# Patient Record
Sex: FEMALE
[De-identification: names, ages, dates, MRNs, and addresses within clinical notes are randomized per-mention and may not be internally consistent; named-entity substitution may affect disease eponyms.]

---

## 2018-05-08 PROBLEM — Z00.00 ENCOUNTER FOR PREVENTIVE HEALTH EXAMINATION: Status: ACTIVE | Noted: 2018-05-08

## 2018-05-09 ENCOUNTER — APPOINTMENT (OUTPATIENT)
Dept: ENDOCRINOLOGY | Facility: CLINIC | Age: 58
End: 2018-05-09

## 2018-08-18 ENCOUNTER — APPOINTMENT (EMERGENCY)
Dept: RADIOLOGY | Facility: HOSPITAL | Age: 58
End: 2018-08-18

## 2018-08-18 ENCOUNTER — HOSPITAL ENCOUNTER (EMERGENCY)
Facility: HOSPITAL | Age: 58
Discharge: HOME | End: 2018-08-18
Attending: EMERGENCY MEDICINE | Admitting: EMERGENCY MEDICINE

## 2018-08-18 VITALS
BODY MASS INDEX: 30.78 KG/M2 | HEIGHT: 70 IN | DIASTOLIC BLOOD PRESSURE: 68 MMHG | RESPIRATION RATE: 17 BRPM | OXYGEN SATURATION: 99 % | WEIGHT: 215 LBS | SYSTOLIC BLOOD PRESSURE: 102 MMHG | HEART RATE: 82 BPM | TEMPERATURE: 98.3 F

## 2018-08-18 DIAGNOSIS — S50.311A ABRASION OF RIGHT ELBOW, INITIAL ENCOUNTER: ICD-10-CM

## 2018-08-18 DIAGNOSIS — S00.01XA ABRASION OF SCALP, INITIAL ENCOUNTER: ICD-10-CM

## 2018-08-18 DIAGNOSIS — S00.93XA HEAD CONTUSION: Primary | ICD-10-CM

## 2018-08-18 PROCEDURE — 70450 CT HEAD/BRAIN W/O DYE: CPT

## 2018-08-18 PROCEDURE — 99284 EMERGENCY DEPT VISIT MOD MDM: CPT

## 2018-08-18 PROCEDURE — 72125 CT NECK SPINE W/O DYE: CPT

## 2018-08-18 PROCEDURE — 72100 X-RAY EXAM L-S SPINE 2/3 VWS: CPT

## 2018-08-18 RX ORDER — LISINOPRIL 40 MG/1
40 TABLET ORAL DAILY
COMMUNITY

## 2018-08-18 RX ORDER — TRAMADOL HYDROCHLORIDE 50 MG/1
50 TABLET ORAL EVERY 6 HOURS PRN
COMMUNITY

## 2018-08-18 RX ORDER — ASPIRIN 81 MG/1
81 TABLET, CHEWABLE ORAL DAILY
COMMUNITY

## 2018-08-18 RX ORDER — IBUPROFEN 600 MG/1
600 TABLET ORAL ONCE
Status: COMPLETED | OUTPATIENT
Start: 2018-08-18 | End: 2018-08-18

## 2018-08-18 RX ADMIN — IBUPROFEN 600 MG: 600 TABLET ORAL at 12:56

## 2018-08-18 NOTE — DISCHARGE INSTRUCTIONS
You were evaluated in the emergency today after your fall  The CT scan of your head and neck and the X-ray of your spine did not show any fracture or intracranial bleed  Take ibuprofen or tylenol as needed for pain  Return to the emergency department for severe worsening headache, persistent vomiting, confusion, loss of consciousness, persistent dizziness, any other new or concerning symptoms  Concussion   WHAT YOU NEED TO KNOW:   A concussion is a mild brain injury  It is usually caused by a bump or blow to the head from a fall, a motor vehicle crash, or a sports injury  Sometimes being shaken forcefully may cause a concussion  DISCHARGE INSTRUCTIONS:   Have someone else call 911 for the following:   · Someone tries to wake you and cannot do so  · You have a seizure, increasing confusion, or a change in personality  · Your speech becomes slurred, or you have new vision problems  Return to the emergency department if:   · You have a severe headache that does not go away  · You have arm or leg weakness, numbness, or new problems with coordination  · You have blood or clear fluid coming out of the ears or nose  Contact your healthcare provider if:   · You have nausea or are vomiting  · You feel more sleepy than usual     · Your symptoms get worse  · Your symptoms last longer than 6 weeks after the injury  · You have questions or concerns about your condition or care  Medicines:   · Acetaminophen  helps to decrease pain  It is available without a doctor's order  Ask how much to take and how often to take it  Follow directions  Acetaminophen can cause liver damage if not taken correctly  · NSAIDs , such as ibuprofen, help decrease swelling and pain  NSAIDs can cause stomach bleeding or kidney problems in certain people  If you take blood thinner medicine, always ask your healthcare provider if NSAIDs are safe for you  Always read the medicine label and follow directions      · Take your medicine as directed  Contact your healthcare provider if you think your medicine is not helping or if you have side effects  Tell him or her if you are allergic to any medicine  Keep a list of the medicines, vitamins, and herbs you take  Include the amounts, and when and why you take them  Bring the list or the pill bottles to follow-up visits  Carry your medicine list with you in case of an emergency  Follow up with your healthcare provider as directed:  Write down your questions so you remember to ask them during your visits  Self-care:   · Rest  from physical and mental activities as directed  Mental activities are those that require thinking, concentration, and attention  You will need to rest until your symptoms are gone  Rest will allow you to recover from your concussion  Ask your healthcare provider when you can return to work and other daily activities  · Have someone stay with you for the first 24 hours after your injury  Your healthcare provider should be contacted if your symptoms get worse, or you develop new symptoms  · Do not participate in sports and physical activities until your healthcare provider says it is okay  They could make your symptoms worse or lead to another concussion  Your healthcare provider will tell you when it is okay for you to return to sports or physical activities  Prevent another concussion:   · Wear protective sports equipment that fit properly  Helmets help decrease your risk of a serious brain injury  Talk to your healthcare provider about ways you can decrease your risk for a concussion if you play sports  · Wear your seat belt  every time you travel  This helps to decrease your risk of a head injury if you are in a car accident  © 2017 [employer] Information is for End User's use only and may not be sold, redistributed or otherwise used for commercial purposes   All illustrations and images included in CareNotes® are the copyrighted property of [employer]  or [employer]  The above information is an  only  It is not intended as medical advice for individual conditions or treatments  Talk to your doctor, nurse or pharmacist before following any medical regimen to see if it is safe and effective for you

## 2018-08-18 NOTE — ED PROVIDER NOTES
History  Chief Complaint   Patient presents with    Head Injury     Patient was helping sister move and fell off step of the back of a box truck  Patient landed on cement ground hitting head  Lac to back of head  HPI  63 y o  Female with PMH DM, HTN, and lumbar spine surgery for herniated discs presents to the ED with headache, right elbow pain, and right lower back pain s/p fall today  Pt reports she was standing in the back of a box truck holding some boxes when she lost her balance and fell backwards onto the pavement and hit her head  Pt denied any dizziness, chest pain, or palpitations prior to the fall  She did not lose consciousness and can remember the whole event  Pt had some dizziness and lightheadedness immediately after the event, but it has improved  Pt states bleeding from the back of her head has slowed down in the ED  No numbness or weakness in her extremities  No incontinence  No neck pain, no pain in her other joints  Prior to Admission Medications   Prescriptions Last Dose Informant Patient Reported? Taking? GLIMEPIRIDE PO  Self Yes Yes   Sig: Take by mouth Unknown dosage   aspirin 81 mg chewable tablet  Self Yes Yes   Sig: Chew 81 mg daily   insulin degludec (TRESIBA FLEXTOUCH) 100 units/mL injection pen  Self Yes Yes   Sig: Inject 36 Units under the skin daily   lisinopril (ZESTRIL) 40 mg tablet  Self Yes Yes   Sig: Take 40 mg by mouth daily   traMADol (ULTRAM) 50 mg tablet  Self Yes Yes   Sig: Take 50 mg by mouth every 6 (six) hours as needed for moderate pain      Facility-Administered Medications: None       No past medical history on file  No past surgical history on file  No family history on file  I have reviewed and agree with the history as documented  Social History   Substance Use Topics    Smoking status: Not on file    Smokeless tobacco: Not on file    Alcohol use Not on file        Review of Systems   Constitutional: Negative for chills and fever     HENT: Negative for congestion, rhinorrhea and sore throat  Eyes: Negative for visual disturbance  Respiratory: Negative for chest tightness and shortness of breath  Cardiovascular: Negative for chest pain  Gastrointestinal: Negative for abdominal pain, diarrhea, nausea and vomiting  Genitourinary: Negative for dysuria and hematuria  Musculoskeletal: Positive for arthralgias, back pain and myalgias  Negative for joint swelling, neck pain and neck stiffness  Skin: Positive for wound (abrasions posterior head, right elbow)  Negative for rash  Neurological: Positive for dizziness, light-headedness and headaches  Negative for syncope, weakness and numbness  All other systems reviewed and are negative  Physical Exam  ED Triage Vitals   Temperature Pulse Respirations Blood Pressure SpO2   08/18/18 1231 08/18/18 1231 08/18/18 1231 08/18/18 1231 08/18/18 1231   98 3 °F (36 8 °C) 85 18 99/73 96 %      Temp Source Heart Rate Source Patient Position - Orthostatic VS BP Location FiO2 (%)   08/18/18 1231 08/18/18 1231 08/18/18 1231 08/18/18 1231 --   Oral Monitor Sitting Right arm       Pain Score       08/18/18 1220       4           Orthostatic Vital Signs  Vitals:    08/18/18 1231 08/18/18 1330   BP: 99/73 102/68   Pulse: 85 82   Patient Position - Orthostatic VS: Sitting Sitting       Physical Exam   Constitutional: She is oriented to person, place, and time  She appears well-developed and well-nourished  No distress  HENT:   Head: Normocephalic and atraumatic  Right Ear: External ear normal    Left Ear: External ear normal    Nose: Nose normal    Eyes: Conjunctivae and EOM are normal  Pupils are equal, round, and reactive to light  Neck: Muscular tenderness present  No spinous process tenderness present  Cardiovascular: Normal rate, regular rhythm, normal heart sounds and intact distal pulses  Exam reveals no gallop and no friction rub  No murmur heard    Pulmonary/Chest: Effort normal and breath sounds normal  No respiratory distress  She has no wheezes  She has no rhonchi  She has no rales  Abdominal: Soft  Bowel sounds are normal  She exhibits no distension and no mass  There is no tenderness  There is no rebound and no guarding  Musculoskeletal: Normal range of motion  No midline spinal tenderness (cervical, thoracic, and lumbar)  Right lower buttock not tender to palpation  Bruising and abrasion of right elbow  Normal range of motion and no pain in other joints an extremities  Lymphadenopathy:     She has no cervical adenopathy  Neurological: She is alert and oriented to person, place, and time  She has normal strength  No cranial nerve deficit or sensory deficit  Skin: Skin is warm and dry  She is not diaphoretic  Psychiatric: She has a normal mood and affect  Nursing note and vitals reviewed  ED Medications  Medications   ibuprofen (MOTRIN) tablet 600 mg (600 mg Oral Given 8/18/18 1256)       Diagnostic Studies  Results Reviewed     None                 CT spine cervical without contrast   Final Result by Denise Mckeon MD (08/18 1417)      No cervical spine fracture or traumatic malalignment  Workstation performed: [id number]         CT head without contrast   Final Result by Denise Mckeon MD (08/18 1412)      No acute intracranial abnormality  Workstation performed: [id number]         XR spine lumbar 2 or 3 views injury    (Results Pending)         Procedures  Procedures      Phone Consults  ED Phone Contact    ED Course  ED Course as of Aug 18 1455   Sat Aug 18, 2018   1455 CTs and XR negative  Head abrasion does not require closure  Will irrigate and discharge  MDM  Number of Diagnoses or Management Options  Diagnosis management comments: 63 y o  Female with head pain, back pain, elbow pain s/p fall today   Low concern for fracture or intracranial injury, but given height of the fall will get CT head/neck  Elbow X-ray not indicated based on exam  No spinal tenderness, but pt concerned for injury given history of lumbar surgeries  Will get lumbar XR  CritCare Time    Disposition  Final diagnoses:   Abrasion of scalp, initial encounter   Abrasion of right elbow, initial encounter   Head contusion     Time reflects when diagnosis was documented in both MDM as applicable and the Disposition within this note     Time User Action Codes Description Comment    8/18/2018  2:41 PM Cleta Rho Add [S00 01XA] Abrasion of scalp, initial encounter     8/18/2018  2:41 PM Cleta Rho Add [S50 311A] Abrasion of right elbow, initial encounter     8/18/2018  2:41 PM Cleta Rho Add [place] Head contusion     8/18/2018  2:41 PM Cleta Rho Modify [S00 01XA] Abrasion of scalp, initial encounter     8/18/2018  2:41 PM Cleta Rho Modify [place] Head contusion       ED Disposition     ED Disposition Condition Comment    Discharge  Claudia Morrell discharge to home/self care  Condition at discharge: Good        Follow-up Information     Follow up With Specialties Details [facility] [address]  Call if need a [phone]      your primary care physician    If symptoms worsen           Patient's Medications   Discharge Prescriptions    No medications on file     No discharge procedures on file  ED Provider  Attending physically available and evaluated Claudia Morrell I managed the patient along with the ED Attending      Electronically Signed by         Cornelius Mckenzie MD  08/18/18 5920

## 2018-08-19 NOTE — ED ATTENDING ATTESTATION
Regan Felder MD, saw and evaluated the patient  I have discussed the patient with the resident/non-physician practitioner and agree with the resident's/non-physician practitioner's findings, Plan of Care, and MDM as documented in the resident's/non-physician practitioner's note, except where noted  All available labs and Radiology studies were reviewed  At this point I agree with the current assessment done in the Emergency Department  I have conducted an independent evaluation of this patient a history and physical is as follows:    41-year-old woman with prior history of lumbar spine surgery presents after fall  Patient states she was helping her sister move and fell stepping off the back of a box truck  Struck her head on the cement  She does recall the events and does not think she lost consciousness  He complains of pain to the back of her head, denies any neck pain  Also notes some low back pain  Denies any weakness or numbness in extremities  No urinary incontinence or retention  No saddle anesthesia  He denies any other complaints  On examination the patient is awake and alert, in no acute distress  Head is normocephalic  There is a small nonsuturable shallow 2 cm laceration to the right posterior scalp  No cervical spine tenderness  No step-off or deformity  Pupils equal and reactive, normal conjunctiva  Moist mucous membranes  Heart regular rate and rhythm, no murmurs rubs or gallops  Lungs clear to auscultation bilaterally  Abdomen soft, nontender, nondistended  Mild lumbar spine tenderness, normal back range of motion  Extremities with normal range of motion and no edema  Skin warm, dry, intact  Nonfocal neuro exam     Assessment plan:  41-year-old woman with mechanical fall  The patient likely did fall from greater than 3 feet based on falling off the back of a box truck and so we will get CT head and cervical spine  Will get x-ray of the lumbar spine      Critical Care Time  CritCare Time    Procedures